# Patient Record
(demographics unavailable — no encounter records)

---

## 2024-11-26 NOTE — HISTORY OF PRESENT ILLNESS
[FreeTextEntry1] : Previous Patient  Chief Complaint: Hx of GG2 PCa s/p focal cryotherapy in 3/21/19, BPH s/p PAE on 4/22/19 Date of first visit: 08/10/2020 Occupation: [employer]  JAY PERRIN is a 72-year-old Non- man, hx of GG2 PCa, who presents for a follow up. He is s/p focal cryoablation for prostate cancer 03/21/2019 and PAE on 04/22/2019. He had erections post cryo, but post PAE they started to decrease. He is off all BPH related meds. He has tried sildenafil up to 80mg but still unable maintain erection.  Voiding symptoms are worsening, nocturia, frequency. Occasional weak stream (only at night, normal flow during day), occ urgency without incontinenc . Denies any hematuria, dysuria, UTIs, episodes of retention. Denies bony pain, fever/chills, mental status changes, shortness of breath.  CT  Hx: CT abdomen and pelvis   10/04/2024 1. Bilateral 2 to 3 mm renal Non obstructing calculi. 2. Massively enlarge prostate protruding into the bladder base. 3. Rectal wall thickening, correlate for proctatitis if clinically suspected. Follow up direct visualization to exclude a lesion. Cologuard last 8/2024, has been instructed to follow up with PCP/GI.   PSA Hx 8.66 09/19/2024 7.85 12/15/2023 7.34 12/2023 per patient 6.78 7/20/23 7.19 9/19/22 6.8 6/16/21 June 2021 - 8.0 August 2020 - 6.0 Pre-Cryo - PSA 14 ng/ml. (on avodart) - 28  Biopsy Hx  He underwent a targeted biopsy with the UroNav MRI fusion on 01/23/2019. The MRI that prompted the biopsy noted a lesion on the left mid PZpm. The patient developed urinary retention. The patient had a 160 gram prostate. The targeted biopsy was positive for Elton 3+4 cancer which did correspond with the target. 5% pattern 4.  The patient in the initial negative prostate biopsy 5/9/2014 (PIN). His PSA history 3.3, following this PSA steadily increased to 6.6, he was recommended Cipro x 1 month. The repeat PSA increased to 6.8. A prostate biopsy was done at that time (5/24/2014).  MRI Hx MRI 11/25/24 no suspicious areas, No EPE. No LAD and no bony lesions. The images have been reviewed and clinical implications discussed with the patient. Still awaiting final read from radiology.  MRI at [facility] on 9/16/2022. 133cc prostate with no suspicious prostate lesion, PIRADS 1. No LAD No EPE, No Bony Lesions. The images have been reviewed and clinical implications discussed with the patient.  MRI at [facility] on 8/3/2021. 143cc prostate with post focal cryo changes, PIRADS 2. No LAD No EPE, No Bony Lesions. The images have been reviewed and clinical implications discussed with the patient.  MRI ([facility]) read 08/10/2020. 142 cc, PSAD 0.02 ng/mlcc. The prior lesion is replaced with scar tissue. No LAD. No EPE. No Bony Lesions. The images have been reviewed and clinical implications discussed with the patient. The right side abnormality was secondary to a cryoprobe used to ablate the tissue to help with his BPH. (3D vol 112 cc)  MRI dated 1/8/2019. Prostate volume 150 cc. Left pzpl 7 mm lesion (image 18), there no signs of EPE and or LAD. The images were reviewed with patient and his wife.  Flow 2019: Peak 9 ml/s, 3.3 ml./s, VV 65 ml. PVR 1 ml.  11/25/2024  IPSS  16 QOL  3 HEBERT 5  12/20/2023 IPSS 16 QOL 4 HEBERT 9 Flow/PVR: vv 60cc not valid. PVR 35cc  08/16/2023 IPSS 5 QOL 0 HEBERT 13 Flow/PVR: Qmax 9.4 ml/s, Qavg 6.3 ml/s, .7cc; PVR 147cc  9/19/2022 IPSS 6 QOL 1 HEBERT 14 Flow/PVR: PVR 2ml. flow not valid  Post PAE for urinary retention IPSS 4 QOL 1 HEBERT 15 (lower) catheter was in for four months.  6/16/21 IPSS 4 QOL 1 HEBERT 9 Flow 2021 - Peak 8.1 ml/s, VV 90 PVR 13 cc  08/10/2020 IPSS 3 QOL 0 HEBERT 14  Pre-PAE IPSS 5 QOL 1 HEBERT 23 (retrograde ejaculation)  The patient denies fevers, chills, nausea and or vomiting and no unexplained weight loss.  All pertinent laboratory, films and physician notes were reviewed. Questionnaire results were discussed with patient.  I spent 31 minutes of non-face-to-face time reviewing the patient's records chart, uploading processing MR images, transferring MR images from PACS to an independent workstation, reviewing images on an independent workstation, speaking with their physician and planning their prostate biopsy and preparation of an upcoming visit for 11/25/2024 .  The imaging and planning was highly complex and took this entire time.

## 2024-11-26 NOTE — ADDENDUM
[FreeTextEntry1] :   I, Dr. Talbert, personally performed the evaluation and management (E/M) services for this established patient who presents today with (a) new problem(s)/exacerbation of (an) existing condition(s).  That E/M includes conducting the examination, assessing all new/exacerbated conditions, and establishing a new plan of care.  Today, my ACP, Cristal Santoyo, ANP-BC, was here to observe my evaluation and management services for this new problem/exacerbated condition to be followed going forward.

## 2024-11-26 NOTE — HISTORY OF PRESENT ILLNESS
[FreeTextEntry1] : Previous Patient  Chief Complaint: Hx of GG2 PCa s/p focal cryotherapy in 3/21/19, BPH s/p PAE on 4/22/19 Date of first visit: 08/10/2020 Occupation: [employer]  JAY PERRIN is a 72-year-old Non- man, hx of GG2 PCa, who presents for a follow up. He is s/p focal cryoablation for prostate cancer 03/21/2019 and PAE on 04/22/2019. He had erections post cryo, but post PAE they started to decrease. He is off all BPH related meds. He has tried sildenafil up to 80mg but still unable maintain erection.  Voiding symptoms are worsening, nocturia, frequency. Occasional weak stream (only at night, normal flow during day), occ urgency without incontinenc . Denies any hematuria, dysuria, UTIs, episodes of retention. Denies bony pain, fever/chills, mental status changes, shortness of breath.  CT  Hx: CT abdomen and pelvis   10/04/2024 1. Bilateral 2 to 3 mm renal Non obstructing calculi. 2. Massively enlarge prostate protruding into the bladder base. 3. Rectal wall thickening, correlate for proctatitis if clinically suspected. Follow up direct visualization to exclude a lesion. Cologuard last 8/2024, has been instructed to follow up with PCP/GI.   PSA Hx 8.66 09/19/2024 7.85 12/15/2023 7.34 12/2023 per patient 6.78 7/20/23 7.19 9/19/22 6.8 6/16/21 June 2021 - 8.0 August 2020 - 6.0 Pre-Cryo - PSA 14 ng/ml. (on avodart) - 28  Biopsy Hx  He underwent a targeted biopsy with the UroNav MRI fusion on 01/23/2019. The MRI that prompted the biopsy noted a lesion on the left mid PZpm. The patient developed urinary retention. The patient had a 160 gram prostate. The targeted biopsy was positive for Elton 3+4 cancer which did correspond with the target. 5% pattern 4.  The patient in the initial negative prostate biopsy 5/9/2014 (PIN). His PSA history 3.3, following this PSA steadily increased to 6.6, he was recommended Cipro x 1 month. The repeat PSA increased to 6.8. A prostate biopsy was done at that time (5/24/2014).  MRI Hx MRI 11/25/24 no suspicious areas, No EPE. No LAD and no bony lesions. The images have been reviewed and clinical implications discussed with the patient. Still awaiting final read from radiology.  MRI at [facility] on 9/16/2022. 133cc prostate with no suspicious prostate lesion, PIRADS 1. No LAD No EPE, No Bony Lesions. The images have been reviewed and clinical implications discussed with the patient.  MRI at [facility] on 8/3/2021. 143cc prostate with post focal cryo changes, PIRADS 2. No LAD No EPE, No Bony Lesions. The images have been reviewed and clinical implications discussed with the patient.  MRI ([facility]) read 08/10/2020. 142 cc, PSAD 0.02 ng/mlcc. The prior lesion is replaced with scar tissue. No LAD. No EPE. No Bony Lesions. The images have been reviewed and clinical implications discussed with the patient. The right side abnormality was secondary to a cryoprobe used to ablate the tissue to help with his BPH. (3D vol 112 cc)  MRI dated 1/8/2019. Prostate volume 150 cc. Left pzpl 7 mm lesion (image 18), there no signs of EPE and or LAD. The images were reviewed with patient and his wife.  Flow 2019: Peak 9 ml/s, 3.3 ml./s, VV 65 ml. PVR 1 ml.  11/25/2024  IPSS  16 QOL  3 HEBERT 5  12/20/2023 IPSS 16 QOL 4 HEBERT 9 Flow/PVR: vv 60cc not valid. PVR 35cc  08/16/2023 IPSS 5 QOL 0 HEBERT 13 Flow/PVR: Qmax 9.4 ml/s, Qavg 6.3 ml/s, .7cc; PVR 147cc  9/19/2022 IPSS 6 QOL 1 HEBERT 14 Flow/PVR: PVR 2ml. flow not valid  Post PAE for urinary retention IPSS 4 QOL 1 HEBERT 15 (lower) catheter was in for four months.  6/16/21 IPSS 4 QOL 1 HEBERT 9 Flow 2021 - Peak 8.1 ml/s, VV 90 PVR 13 cc  08/10/2020 IPSS 3 QOL 0 HEBERT 14  Pre-PAE IPSS 5 QOL 1 HEBERT 23 (retrograde ejaculation)  The patient denies fevers, chills, nausea and or vomiting and no unexplained weight loss.  All pertinent laboratory, films and physician notes were reviewed. Questionnaire results were discussed with patient.  I spent 31 minutes of non-face-to-face time reviewing the patient's records chart, uploading processing MR images, transferring MR images from PACS to an independent workstation, reviewing images on an independent workstation, speaking with their physician and planning their prostate biopsy and preparation of an upcoming visit for 11/25/2024 .  The imaging and planning was highly complex and took this entire time.

## 2024-11-26 NOTE — PHYSICAL EXAM
[Normal] : well developed, well nourished, in no acute distress [] : no respiratory distress [Oriented To Time, Place, And Person] : oriented to person, place, and time [FreeTextEntry1] : 72 year old male, hx of GG2 PCa, s/p focal cryoablation (03/21/2019) with freezing of contralateral gland one probe (5 cm). PSA pre treatment 28--> 7.8 ng/ml, most recently 8.66.   12/20/23 in office US- prostate 172cc with 2.2cm median lobe  Flank pain/Worsening urinary frequency - Renal US in office 12/20/23- bilateral non obstructing stones (see report), dilated proximal right ureter, mild left hydro -- he is likely passing bilateral stones, was sent to ER at that time - no flank pain today 11/25/24  bilateral renal stones - increase hydration, 2-3 L water/day  - Increase fruits and vegetables, limit animal protein and salt - [facility] then telehealth with NP Elpidio   Prostate cancer - PSA stable 7.8 ng/ml. Due for post cryo biopsy (was cancelled August 2022 due to wife's emergency), discussed again today, shared patient discussion in which will forego for now given negative MRI, stable PSA. - MRI 9/16/22- agree with negative read  - MRI 11/25/24 negative, still awaiting final read   BPH - worsening LUTS, could consider 2nd PAE (should be TF approach)  - alfuzosin trial   The patient understands that this medication may cause dizziness, retrograde ejaculation or nasal congestion among other complications. I recommended that the patient take this medication at night. If the side effects become too bothersome, the medication can be discontinued.   ED - PRN sildenafil, emphasized taking without alcohol and on empty stomach, has taken up to 80mg - switch to tadalafil - do NOT take within 4 hours from alfuzosin  - try penile ring  - consider consult with Dr. Ching in future   Thank you very much for allowing me to assist in the care of this patient. Please do not hesitate to contact me with any additional questions or concerns.     Sincerely,     Abhay Talbert D.O. Professor of Urology and Radiology  of Urology at [facility] Director for Prostate Cancer [address] [facility] Phone: [phone]   I was present with the Resident (Satish Kaur MD - PGY6) during the key portions of the history and exam. I discussed the case with the Resident and agree with the findings and plan as documented in the Resident/Fellow 's note, unless noted below.